# Patient Record
Sex: MALE | Race: WHITE | URBAN - METROPOLITAN AREA
[De-identification: names, ages, dates, MRNs, and addresses within clinical notes are randomized per-mention and may not be internally consistent; named-entity substitution may affect disease eponyms.]

---

## 2022-03-18 PROBLEM — R07.9 CHEST PAIN: Status: ACTIVE | Noted: 2019-03-19

## 2022-03-18 PROBLEM — R10.13 EPIGASTRIC ABDOMINAL PAIN: Status: ACTIVE | Noted: 2021-12-13

## 2022-03-18 PROBLEM — G47.09 OTHER INSOMNIA: Status: ACTIVE | Noted: 2019-07-15

## 2022-03-19 PROBLEM — N40.1 BENIGN PROSTATIC HYPERPLASIA WITH POST-VOID DRIBBLING: Status: ACTIVE | Noted: 2021-06-01

## 2022-03-19 PROBLEM — R73.01 IMPAIRED FASTING GLUCOSE: Status: ACTIVE | Noted: 2019-07-16

## 2022-03-19 PROBLEM — N39.43 BENIGN PROSTATIC HYPERPLASIA WITH POST-VOID DRIBBLING: Status: ACTIVE | Noted: 2021-06-01

## 2022-03-19 PROBLEM — E83.52 HYPERCALCEMIA: Status: ACTIVE | Noted: 2021-06-27

## 2022-03-19 PROBLEM — L57.0 KERATOSIS, ACTINIC: Status: ACTIVE | Noted: 2020-02-24

## 2022-03-19 PROBLEM — R10.84 GENERALIZED ABDOMINAL PAIN: Status: ACTIVE | Noted: 2021-12-02

## 2022-03-19 PROBLEM — G51.0 CRANIAL NERVE VII PALSY: Status: ACTIVE | Noted: 2021-06-26

## 2022-03-19 PROBLEM — I10 ESSENTIAL HYPERTENSION: Status: ACTIVE | Noted: 2019-11-21

## 2022-03-20 PROBLEM — R91.8 PULMONARY NODULES: Status: ACTIVE | Noted: 2021-01-15

## 2022-06-16 ENCOUNTER — TELEPHONE (OUTPATIENT)
Dept: ORTHOPEDIC SURGERY | Age: 77
End: 2022-06-16

## 2022-06-16 DIAGNOSIS — M54.17 LUMBOSACRAL RADICULOPATHY: Primary | ICD-10-CM

## 2022-06-16 NOTE — LETTER
[facility]  [address] [address]  Phone: [phone]  Fax: [phone]    Antonio Cantrell MD        2022      Lumbar Spine Injection Precert Authorization Form    Name: Vishal Hagen  : 1945  Age: 64 y.o. Gender: male    Clinical Information    Referring Doctor: Kathy Lane MD  Diagnosis:     ICD-10-CM    1. Lumbosacral radiculopathy  M54.17    . Body Part: lumbar spine    Type of Service    [x ] 85303+- LAURA Caudal or Lumbar    [ ] 37635- Facet Lumbar (single Level)    [ ] 20663- Facet 2nd Level    [ ] 90627- Facet 3 or more level    [ ] 94752- Sacroiliac joint     [ ] 11757- SNRB Transforaminal LAURA Lumbar or Sacral (single level)    [ ] 66595- SNRB add levels Lumbar or Sacral     [ ] 25350- Sciatic Nerve, single.      [ ] 61555- Radiofrequency L/S single facet     [ ] 36043- Radiofrequency L/s additional facet       Comments  4-5 LAURA    Insurance:        Sincerely,        Antonio Cantrell MD

## 2022-06-17 NOTE — TELEPHONE ENCOUNTER
l    Name: Raji Navarro  YOB: 1945  Gender: male  MRN: [id number]      This patient has requested repeat epidural steroid injection. The most recent injection provided 80% pain reduction and improvement in functioning for at least 3 months. The patient's current pain scale is 8/10. .    As a result of the current recurrence of pain, the patient is having the following difficulties:  Difficulties with bathing and/or dressing  Difficulty sleeping at night  Difficulty transferring into or out of a car  Difficulty performing housework  Difficulty with sitting or performing work related activities  The patient has severe pain limiting function despite on-going, conservative, physician-guided treatment. The patient is currently regularly engaging in physician or PT directed lumbar spine exercies.       Sammie Solomon MD

## 2022-06-30 ENCOUNTER — OFFICE VISIT (OUTPATIENT)
Dept: ORTHOPEDIC SURGERY | Age: 77
End: 2022-06-30

## 2022-06-30 DIAGNOSIS — M54.17 LUMBOSACRAL RADICULOPATHY: Primary | ICD-10-CM

## 2022-06-30 RX ORDER — METHYLPREDNISOLONE ACETATE 40 MG/ML
40 INJECTION, SUSPENSION INTRA-ARTICULAR; INTRALESIONAL; INTRAMUSCULAR; SOFT TISSUE ONCE
Qty: 1 ML | Refills: 0 | Status: CANCELLED
Start: 2022-06-30 | End: 2022-06-30

## 2022-06-30 NOTE — PROGRESS NOTES
He did not stop plavix. Will reschedule for 7-19 and he will discontinue plavix 6 days prior. He may stay on this asa.

## 2022-07-19 ENCOUNTER — OFFICE VISIT (OUTPATIENT)
Dept: ORTHOPEDIC SURGERY | Age: 77
End: 2022-07-19
Payer: COMMERCIAL

## 2022-07-19 DIAGNOSIS — M54.50 LOW BACK PAIN, UNSPECIFIED BACK PAIN LATERALITY, UNSPECIFIED CHRONICITY, UNSPECIFIED WHETHER SCIATICA PRESENT: Primary | ICD-10-CM

## 2022-07-19 DIAGNOSIS — M54.17 LUMBOSACRAL RADICULOPATHY: ICD-10-CM

## 2022-07-19 PROCEDURE — 20552 NJX 1/MLT TRIGGER POINT 1/2: CPT | Performed by: PHYSICAL MEDICINE & REHABILITATION

## 2022-07-19 PROCEDURE — 62323 NJX INTERLAMINAR LMBR/SAC: CPT | Performed by: PHYSICAL MEDICINE & REHABILITATION

## 2022-07-19 RX ORDER — METHYLPREDNISOLONE ACETATE 40 MG/ML
220 INJECTION, SUSPENSION INTRA-ARTICULAR; INTRALESIONAL; INTRAMUSCULAR; SOFT TISSUE ONCE
Status: COMPLETED | OUTPATIENT
Start: 2022-07-19 | End: 2022-07-19

## 2022-07-19 RX ADMIN — METHYLPREDNISOLONE ACETATE 220 MG: 40 INJECTION, SUSPENSION INTRA-ARTICULAR; INTRALESIONAL; INTRAMUSCULAR; SOFT TISSUE at 10:46

## 2023-01-19 ENCOUNTER — OFFICE VISIT (OUTPATIENT)
Dept: ORTHOPEDIC SURGERY | Age: 78
End: 2023-01-19

## 2023-01-19 ENCOUNTER — CLINICAL DOCUMENTATION (OUTPATIENT)
Dept: ORTHOPEDIC SURGERY | Age: 78
End: 2023-01-19

## 2023-01-19 DIAGNOSIS — M54.50 LUMBAR BACK PAIN: ICD-10-CM

## 2023-01-19 DIAGNOSIS — M48.061 SPINAL STENOSIS OF LUMBAR REGION WITHOUT NEUROGENIC CLAUDICATION: ICD-10-CM

## 2023-01-19 DIAGNOSIS — M47.816 SPONDYLOSIS OF LUMBAR REGION WITHOUT MYELOPATHY OR RADICULOPATHY: ICD-10-CM

## 2023-01-19 DIAGNOSIS — M54.17 LUMBOSACRAL RADICULOPATHY: Primary | ICD-10-CM

## 2023-01-19 NOTE — PROGRESS NOTES
Date:  01/19/23     HPI:  I am seeing Sky Harper in evalution/folowup. I last saw him in the office setting in February. He has longstanding lumbar spine pain, did a lot of work in the past to include . Lumbar paraspinal pain that gravitates to the buttocks. Possibly some mild weakness with this but he does have COPD. MR imaging has revealed moderate spinal stenosis at the L3-L4 level. He has not felt the spine surgery is required, as it is more of a last resort. He has had translaminar epidural steroid injections with excellent responses of greater 90% pain reduction in the last 3 months or more. The last injection of this type was in July and has gone a little bit longer with it as he does better when his is warmer now that is cooling off more the pain is come back in a similar fashion. I do not get a history of a progressive gait disturbance. The last injection offered a 90% pain reduction for over 3 months. His current pain scale is 9/10. He has trouble sleeping, getting into out of a car. Has trouble bathing, cleaning, dressing. He is attempted lumbar spine exercises along the way he cannot do them because it produces significant pain. He also tells me he has significant depressed range of motion in the joints in the lower extremities that aggravates. ROS: As above. He does take Plavix as well as baby aspirin. He has never been told he needs permission to come off Plavix for appropriate procedures. He has been taking some ibuprofen as of 8 instead of hydrocodone. He has history of gastric or gastrointestinal irritation. Told him he should not take the ibuprofen specially for that reason as well as the fact he is also on Plavix and aspirin. He would he discuss with his other physicians about other appropriate options. PE: Certain cooperative. Good strength lower extremities. No lateralizing sensory findings. Has tenderness in lower lumbar paraspinal areas. Ambulates without assistance. He has good range of motion of the hips    Assessment/Plan:  PREDOMINANTLY MUSCULOSKELETAL LUMBOSACRAL  SPINE PAIN. Moderate spinal stenosis, he is doing reasonably well and I do not think there is a spine surgical issue. We do not need to reimage. His track record with epidural steroid injections with trigger point ejections is very good. He has an excellent pain reduction and he does go over 3 months. The pain has come back in a similar fashion so recommend repeating those injections once we have insurance approval.  He may stay on the baby aspirin but we need to stop his Plavix 6-7 days prior. Otherwise we will see him back on a yearly basis.     Chris Poon MD

## 2023-01-19 NOTE — PROGRESS NOTES
[facility]#[id number]  PER-CERT DONE ONLINE. REQ, CLINICALS AND MRI REPORT WERE UPLOADED

## 2023-01-20 ENCOUNTER — CLINICAL DOCUMENTATION (OUTPATIENT)
Dept: ORTHOPEDIC SURGERY | Age: 78
End: 2023-01-20

## 2023-01-20 NOTE — PROGRESS NOTES
PER [facility]. Elizabeth Tracy Pending: Missing information (clinical)  Tracking #[id number]  Note: Authorization pended for missing information. Please provide documentation with patient's name and date of birth for the following:  Physical exam findings including symptoms of radiculopathy.   SENT MESSAGE TO BOWEN

## 2023-01-20 NOTE — PROGRESS NOTES
Received a call from [facility] they have to receive something within 24 hours or it goes into p2p.   I know he's already gone, but I notified Eleanor Beckham

## 2023-01-24 ENCOUNTER — CLINICAL DOCUMENTATION (OUTPATIENT)
Dept: ORTHOPEDIC SURGERY | Age: 78
End: 2023-01-24

## 2023-01-24 NOTE — PROGRESS NOTES
PER YOMAIRA MCDOWELL/ [facility]HERE p2p SCHEDULE FOR 01/25 @ 4:30 W/ DR. Lenny Cordero, NOTIFIED Dickson Tan

## 2023-02-02 ENCOUNTER — OFFICE VISIT (OUTPATIENT)
Dept: ORTHOPEDIC SURGERY | Age: 78
End: 2023-02-02

## 2023-02-02 DIAGNOSIS — M54.50 LUMBAR BACK PAIN: ICD-10-CM

## 2023-02-02 DIAGNOSIS — M54.17 LUMBOSACRAL RADICULOPATHY: Primary | ICD-10-CM

## 2023-02-02 RX ORDER — TRIAMCINOLONE ACETONIDE 40 MG/ML
220 INJECTION, SUSPENSION INTRA-ARTICULAR; INTRAMUSCULAR ONCE
Status: COMPLETED | OUTPATIENT
Start: 2023-02-02 | End: 2023-02-02

## 2023-02-02 RX ADMIN — TRIAMCINOLONE ACETONIDE 220 MG: 40 INJECTION, SUSPENSION INTRA-ARTICULAR; INTRAMUSCULAR at 13:47

## 2023-06-20 ENCOUNTER — OFFICE VISIT (OUTPATIENT)
Dept: ORTHOPEDIC SURGERY | Age: 78
End: 2023-06-20

## 2023-06-20 DIAGNOSIS — M54.50 LUMBAR BACK PAIN: ICD-10-CM

## 2023-06-20 DIAGNOSIS — M54.17 LUMBOSACRAL RADICULOPATHY: Primary | ICD-10-CM

## 2023-06-20 RX ORDER — TRIAMCINOLONE ACETONIDE 40 MG/ML
100 INJECTION, SUSPENSION INTRA-ARTICULAR; INTRAMUSCULAR ONCE
Status: COMPLETED | OUTPATIENT
Start: 2023-06-20 | End: 2023-06-20

## 2023-06-20 RX ORDER — TRIAMCINOLONE ACETONIDE 40 MG/ML
220 INJECTION, SUSPENSION INTRA-ARTICULAR; INTRAMUSCULAR ONCE
Status: SHIPPED | OUTPATIENT
Start: 2023-06-20

## 2023-06-20 RX ADMIN — TRIAMCINOLONE ACETONIDE 100 MG: 40 INJECTION, SUSPENSION INTRA-ARTICULAR; INTRAMUSCULAR at 10:58

## 2023-06-20 NOTE — PROGRESS NOTES
Date: 06/20/23   Name: Masoud Brendan    Pre-Procedural Diagnosis:    Diagnosis Orders   1. Lumbosacral radiculopathy  XR INJ SPINE THER SUBST LUM/SAC W IMG    triamcinolone acetonide (KENALOG-40) injection 220 mg    triamcinolone acetonide (KENALOG-40) injection 100 mg      2. Lumbar back pain  triamcinolone acetonide (KENALOG-40) injection 220 mg    triamcinolone acetonide (KENALOG-40) injection 100 mg          Procedure: Lumbar Epidural Steroid Injection (LAURA)    Precautions: [facility] Precautions spine injections: None. Patient denies any prior sensitivity to steroid, local anesthetic, contrast dye, iodine or shellfish. The procedure was discussed at length with the patient and informed consent was signed. The patient was placed in a prone position on the fluoroscopy table and the skin was prepped and draped in a routine sterile fashion. The area to be injected was anesthetized with approximately 5 cc of 1% Lidocaine. Initially a 22-gauge 3.5 inch spinal needle was carefully advanced under fluoroscopic guidance to the right L3-L4 interlaminar epidural space. At this time 0.25 cc of omnipaque administered. . Once proper placement was confirmed, 1.5 cc of sterile water and 100 mg of Kenalog were injected through the spinal needle. Fluoroscopic guidance was used intermittently over a 10-minute period to insure proper needle placement and patient safety. A hard copy of the fluoroscopic  images has been placed in the patient's chart. The patient was monitored after the procedure and discharged home without complication. A total of 2.5 cc of Kenalog were administered during this procedure.     Resume Meds:  Patient to resume Plavix in the AM. Remains on asa 81 mg.    Dawood Cummings MD  06/20/23

## 2023-11-27 ENCOUNTER — OFFICE VISIT (OUTPATIENT)
Dept: ORTHOPEDIC SURGERY | Age: 78
End: 2023-11-27
Payer: COMMERCIAL

## 2023-11-27 DIAGNOSIS — M54.50 LUMBAR BACK PAIN: ICD-10-CM

## 2023-11-27 DIAGNOSIS — M54.17 LUMBOSACRAL RADICULOPATHY: Primary | ICD-10-CM

## 2023-11-27 DIAGNOSIS — M48.062 SPINAL STENOSIS, LUMBAR REGION WITH NEUROGENIC CLAUDICATION: ICD-10-CM

## 2023-11-27 PROCEDURE — 99214 OFFICE O/P EST MOD 30 MIN: CPT | Performed by: PHYSICAL MEDICINE & REHABILITATION

## 2023-11-27 PROCEDURE — 1123F ACP DISCUSS/DSCN MKR DOCD: CPT | Performed by: PHYSICAL MEDICINE & REHABILITATION

## 2023-11-27 RX ORDER — METHYLPREDNISOLONE 4 MG/1
TABLET ORAL
Qty: 1 KIT | Refills: 0 | Status: SHIPPED | OUTPATIENT
Start: 2023-11-27 | End: 2023-12-03

## 2023-11-27 RX ORDER — HYDROCODONE BITARTRATE AND ACETAMINOPHEN 5; 325 MG/1; MG/1
1 TABLET ORAL 2 TIMES DAILY PRN
Qty: 10 TABLET | Refills: 0 | Status: SHIPPED | OUTPATIENT
Start: 2023-11-27 | End: 2023-12-07

## 2023-11-27 NOTE — PROGRESS NOTES
Date:  11/27/23     HPI:  I am seeing Fernie Yanez in evalution/folowup. Office visit of greater than 30 minutes. Discussed situation fully with the patient and his daughter. I last saw him in the office setting in January. He has pain in the lower lumbar areas that gravitates to the buttocks. Translaminar epidural steroid injections have helped, typically 80+ percent benefit for 3 months or more. The last set of injections were performed in June and these offered significant benefit for about 3 months. For some reason he did not call back that he was having problems and when he presents for the scheduled office visit today he was thinking he was going to get an injection. He stopped his Plavix for the last week. The pain is in a similar fashion but there still is a component of the lower lumbar paraspinal areas. That is significant. I do not get a history of a progressive gait disturbance, sounds like he can walk for a while and the legs might get little bit weaker. Does not sound like is a lot worse than it was last year. The last injection offered significant pain reduction for 3 months. His current pain scale is 8/10. He has trouble sleeping, getting into out of a car, bathing, cleaning, dressing. He has tried lumbar spine exercises in the past and cannot perform because it produced pain he also has depressed range of motion of the joints for which she does not successfully perform them. ROS: Unaware of any problems since last being seen. PE: Alert and cooperative. Reasonably good strength lower extremities. Tenderness to lower lumbar paraspinal areas. Ambulates without assistance. Assessment/Plan:       PREDOMINANTLY MUSCULOSKELETAL LUMBOSACRAL  SPINE PAIN. In the context of lumbar spinal stenosis, I do believe his gait is stable. I do not think there is a spine surgical issue.   I would recommend a repeat translaminar lumbar LAURA and also offer trigger point injections

## 2023-12-08 ENCOUNTER — OFFICE VISIT (OUTPATIENT)
Dept: ORTHOPEDIC SURGERY | Age: 78
End: 2023-12-08
Payer: COMMERCIAL

## 2023-12-08 DIAGNOSIS — M60.9 MYOSITIS, UNSPECIFIED MYOSITIS TYPE, UNSPECIFIED SITE: ICD-10-CM

## 2023-12-08 DIAGNOSIS — M54.50 LUMBAR BACK PAIN: ICD-10-CM

## 2023-12-08 DIAGNOSIS — M54.17 LUMBOSACRAL RADICULOPATHY: Primary | ICD-10-CM

## 2023-12-08 PROCEDURE — 20552 NJX 1/MLT TRIGGER POINT 1/2: CPT | Performed by: PHYSICAL MEDICINE & REHABILITATION

## 2023-12-08 PROCEDURE — 62323 NJX INTERLAMINAR LMBR/SAC: CPT | Performed by: PHYSICAL MEDICINE & REHABILITATION

## 2023-12-08 RX ORDER — TRIAMCINOLONE ACETONIDE 40 MG/ML
220 INJECTION, SUSPENSION INTRA-ARTICULAR; INTRAMUSCULAR ONCE
Status: COMPLETED | OUTPATIENT
Start: 2023-12-08 | End: 2023-12-08

## 2023-12-08 RX ADMIN — TRIAMCINOLONE ACETONIDE 220 MG: 40 INJECTION, SUSPENSION INTRA-ARTICULAR; INTRAMUSCULAR at 10:58

## 2023-12-08 NOTE — PROGRESS NOTES
Date: 12/08/23   Name: Florence Nixon    Pre-Procedural Diagnosis:    Diagnosis Orders   1. Lumbosacral radiculopathy  XR INJ SPINE THER SUBST LUM/SAC W IMG    NJ INJECTION SINGLE/MLT TRIGGER POINT 1/2 MUSCLES    triamcinolone acetonide (KENALOG-40) injection 220 mg      2. Lumbar back pain  XR INJ SPINE THER SUBST LUM/SAC W IMG    NJ INJECTION SINGLE/MLT TRIGGER POINT 1/2 MUSCLES    triamcinolone acetonide (KENALOG-40) injection 220 mg      3. Myositis, unspecified myositis type, unspecified site            Procedure: Lumbar Epidural Steroid Injection (LAURA) with Trigger Point Injection(s)    Precautions: [facility] Precautions spine injections: None. Patient denies any prior sensitivity to steroid, local anesthetic, contrast dye, iodine or shellfish. The procedure was discussed at length with the patient and informed consent was signed. The patient was placed in a prone position on the fluoroscopy table and the skin was prepped and draped in a routine sterile fashion. The areas to be injected were each anesthetized with approximately 5 cc of 1% Lidocaine. Initially a 22-gauge 3.5 inch spinal needle was carefully advanced under fluoroscopic guidance to the right L3-L4 interlaminar epidural space. At this time 0.25 cc of omnipaque administered. . Once proper placement was confirmed, 1.5 cc of sterile water and 100 mg of Kenalog were injected through the spinal needle. After informed oral consent, patient was prepped per routine. The bilateral lumbar paraspinal area(s) were injected. 60 mg of Kenalog with 1 cc of 0.25% Marcaine injected at each site. Patient tolerated well. Band aid(s) applied. Fluoroscopic guidance was used intermittently over a 10-minute period to insure proper needle placement and patient safety. A hard copy of the fluoroscopic  images has been placed in the patient's chart. The patient was monitored after the procedure and discharged home without complication.      A total of two